# Patient Record
Sex: FEMALE
[De-identification: names, ages, dates, MRNs, and addresses within clinical notes are randomized per-mention and may not be internally consistent; named-entity substitution may affect disease eponyms.]

---

## 2021-06-24 ENCOUNTER — HOSPITAL ENCOUNTER (OUTPATIENT)
Dept: HOSPITAL 95 - ORSCSDS | Age: 36
Discharge: HOME | End: 2021-06-24
Attending: OBSTETRICS & GYNECOLOGY
Payer: COMMERCIAL

## 2021-06-24 VITALS — BODY MASS INDEX: 30.12 KG/M2 | HEIGHT: 65 IN | WEIGHT: 180.78 LBS

## 2021-06-24 DIAGNOSIS — N95.9: Primary | ICD-10-CM

## 2021-06-24 DIAGNOSIS — N92.1: ICD-10-CM

## 2021-06-24 PROCEDURE — 0UDB8ZX EXTRACTION OF ENDOMETRIUM, VIA NATURAL OR ARTIFICIAL OPENING ENDOSCOPIC, DIAGNOSTIC: ICD-10-PCS | Performed by: OBSTETRICS & GYNECOLOGY

## 2022-03-04 ENCOUNTER — HOSPITAL ENCOUNTER (OUTPATIENT)
Dept: HOSPITAL 95 - ORSCSDS | Age: 37
Discharge: HOME | End: 2022-03-04
Attending: ORTHOPAEDIC SURGERY
Payer: COMMERCIAL

## 2022-03-04 VITALS — BODY MASS INDEX: 29.79 KG/M2 | WEIGHT: 178.79 LBS | HEIGHT: 65 IN

## 2022-03-04 DIAGNOSIS — G56.01: Primary | ICD-10-CM

## 2022-03-04 DIAGNOSIS — Z79.899: ICD-10-CM

## 2022-03-04 DIAGNOSIS — E66.9: ICD-10-CM

## 2022-03-04 PROCEDURE — A9270 NON-COVERED ITEM OR SERVICE: HCPCS

## 2022-03-04 PROCEDURE — 01N50ZZ RELEASE MEDIAN NERVE, OPEN APPROACH: ICD-10-PCS | Performed by: ORTHOPAEDIC SURGERY

## 2022-12-09 ENCOUNTER — HOSPITAL ENCOUNTER (OUTPATIENT)
Dept: HOSPITAL 95 - ORSCSDS | Age: 37
Discharge: HOME | End: 2022-12-09
Attending: ORTHOPAEDIC SURGERY
Payer: COMMERCIAL

## 2022-12-09 VITALS — WEIGHT: 170.86 LBS | BODY MASS INDEX: 28.47 KG/M2 | HEIGHT: 65 IN

## 2022-12-09 DIAGNOSIS — G56.02: Primary | ICD-10-CM

## 2022-12-09 PROCEDURE — 01N50ZZ RELEASE MEDIAN NERVE, OPEN APPROACH: ICD-10-PCS | Performed by: ORTHOPAEDIC SURGERY

## 2022-12-09 NOTE — NUR
12/09/22 1018 Héctor Whatley
ROPIVACAINE 0.5% 1:200,000 MIXED W/ LIDOACINE 2% 1:100,000 1:1 FOR
INJECTION AT OPSITE BY DR ACKERMAN. 10 MLS INJECTED.